# Patient Record
Sex: MALE | Race: WHITE
[De-identification: names, ages, dates, MRNs, and addresses within clinical notes are randomized per-mention and may not be internally consistent; named-entity substitution may affect disease eponyms.]

---

## 2018-08-01 ENCOUNTER — HOSPITAL ENCOUNTER (OUTPATIENT)
Dept: HOSPITAL 47 - RADCTMAIN | Age: 53
Discharge: HOME | End: 2018-08-01
Attending: SURGERY
Payer: COMMERCIAL

## 2018-08-01 DIAGNOSIS — K76.0: ICD-10-CM

## 2018-08-01 DIAGNOSIS — N32.89: Primary | ICD-10-CM

## 2018-08-01 DIAGNOSIS — R16.0: ICD-10-CM

## 2018-08-01 PROCEDURE — 74177 CT ABD & PELVIS W/CONTRAST: CPT

## 2018-08-01 NOTE — CT
EXAMINATION TYPE: CT abdomen pelvis w con

 

DATE OF EXAM: 8/1/2018

 

COMPARISON: NONE

 

HISTORY: 53-year-old male Right side mid abdominal pain x2 months.   Spigelian hernia.

 

TECHNIQUE: Contiguous axial scanning of the abdomen and pelvis following administration of 100 ml Iso
sobeida 300 IV contrast.  Delayed images through the kidneys and coronal/sagittal reconstructions perform
ed.

 

CT DLP: 665.5 mGycm

Automated exposure control for dose reduction was used.

 

 

FINDINGS:

Heart is normal size without pericardial effusion. Lung bases clear without pleural effusion.

 

Liver mildly enlarged measuring 19.0 cm. Diffuse low attenuation of the hepatic parenchyma. No focal 
lesion is seen.

 

Gallbladder, adrenal glands, kidneys, spleen, and pancreas show no gross abnormality.

 

Portal venous system is patent. No biliary ductal dilatation.

 

No dilated small bowel, free fluid, or free air. No mesenteric or retroperitoneal lymphadenopathy.

 

Normal appendix. No significant stool wording. Oral contrast has progressed to the proximal sigmoid.

 

There is moderate circumferential wall thickening of the bladder. Prostate gland measures 4.4 cm wide
. No abnormal fluid collection in the pelvis or pelvic lymphadenopathy.

 

With attention to the abdominal wall, and no discrete hernia or abdominal wall defect is identified. 
No masses seen in the subcutaneous adipose layer

 

Bones: Mild facet arthropathy lower lumbar spine. Degenerative disc disease from L2 through S1 levels
. Grade 1 retrolisthesis at L3-L4 and L4-L5.

 

 

 

 

IMPRESSION: 

 

1. NO VENTRAL ABDOMINAL WALL OR SPIGELIAN HERNIA IDENTIFIED. IF PERSISTENT CONCERN, THE STUDY CAN BE 
REVIEWED WITH DIRECTED ATTENTION.

2. 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

3. MODERATE CIRCUMFERENTIAL BLADDER WALL THICKENING COULD REPRESENT CHRONIC BLADDER WALL HYPERTROPHY.
 CORRELATE TO EXCLUDE CYSTITIS.

## 2018-12-10 ENCOUNTER — HOSPITAL ENCOUNTER (OUTPATIENT)
Dept: HOSPITAL 47 - RADNMMAIN | Age: 53
Discharge: HOME | End: 2018-12-10
Attending: SURGERY
Payer: COMMERCIAL

## 2018-12-10 DIAGNOSIS — K82.8: Primary | ICD-10-CM

## 2018-12-10 PROCEDURE — 78226 HEPATOBILIARY SYSTEM IMAGING: CPT

## 2018-12-10 NOTE — NM
Nuclear medicine hepatobiliary scan.

 

HISTORY: Pain.

 

DOSAGE: The patient received  8 ounces of ensure plus and  5.2  mCi of Technetium 99m Choletec.  

 

FINDINGS: There is normal hepatic extraction.  Now localized area of diminished uptake involving the 
left lobe of the liver likely artifactual. Correlate with ultrasound as clinically warranted. No abno
rmality noted in this region on the CT scan of 8/1/2018 The gallbladder is seen by 10 minutes.  There
 is biliary to bowel clearance by 45 minutes.  Ejection fraction is 51%.

 

IMPRESSION:

1. No evidence of cholecystitis.

2. Ejection fraction is 51%.

## 2018-12-11 ENCOUNTER — HOSPITAL ENCOUNTER (OUTPATIENT)
Dept: HOSPITAL 47 - RADCTMAIN | Age: 53
Discharge: HOME | End: 2018-12-11
Attending: SURGERY
Payer: COMMERCIAL

## 2018-12-11 DIAGNOSIS — K82.0: Primary | ICD-10-CM

## 2018-12-11 DIAGNOSIS — K44.9: ICD-10-CM

## 2018-12-11 DIAGNOSIS — K76.0: ICD-10-CM

## 2018-12-11 PROCEDURE — 74177 CT ABD & PELVIS W/CONTRAST: CPT

## 2018-12-12 NOTE — CT
EXAMINATION TYPE: CT abdomen pelvis w con

 

DATE OF EXAM: 12/11/2018

 

COMPARISON: 8/1/2018

 

HISTORY: RUQ pain

 

CT DLP: 589.4 mGycm

Automated exposure control for dose reduction was used.

 

TECHNIQUE:  Helical acquisition of images was performed from the lung bases through the pelvis.

 

CONTRAST: 

Performed with Oral Contrast and with IV Contrast, patient injected with 100 mL of Isovue 300.

 

FINDINGS: 

 

LUNG BASES: No significant abnormality is appreciated.

 

LIVER/GB: Hepatic parenchyma is diffusely hypoattenuated in comparison to that of the spleen, most co
mmonly seen in hepatic steatosis. This finding limits evaluation for hepatic masses. No gross evidenc
e of hepatic mass is seen. No intrahepatic biliary ductal dilatation. No cholelithiasis. Gallbladder 
is contracted

 

PANCREAS: No significant abnormality is seen.

 

SPLEEN: No significant abnormality is seen.

 

ADRENALS: No significant abnormality is seen.

 

KIDNEYS: Kidneys enhance and excrete symmetrically without hydronephrosis.

 

ADENOPATHY:  No greater than 1 cm short axis lymph node is seen within the abdomen or pelvis.

 

REPRODUCTIVE ORGANS: Prostate gland is heterogenous containing central zone calcifications.

 

URINARY BLADDER:  Urinary bladder is better distended on the current examination than on the prior.

 

OSSEOUS STRUCTURES:  Mild multilevel degenerative changes of the spine are present

 

BOWEL:  Small hiatal hernia is noted. No dilated large or small bowel is seen. Moderate amount retain
ed colonic stool is noted. Appendix is air-filled and within normal limits

 

 

IMPRESSION: 

1. REDEMONSTRATION OF MILD GRADE HEPATIC STEATOSIS.

2. CONTRACTED GALLBLADDER WITH NO INTRAHEPATIC BILIARY DUCTAL DILATATION OR EXTRAHEPATIC BILIARY DUCT
AL DILATATION IDENTIFIED.

3. SMALL HIATAL HERNIA.

## 2019-01-08 ENCOUNTER — HOSPITAL ENCOUNTER (OUTPATIENT)
Dept: HOSPITAL 47 - ORWHC2ENDO | Age: 54
Discharge: HOME | End: 2019-01-08
Attending: SURGERY
Payer: COMMERCIAL

## 2019-01-08 VITALS — RESPIRATION RATE: 18 BRPM | HEART RATE: 53 BPM | DIASTOLIC BLOOD PRESSURE: 68 MMHG | SYSTOLIC BLOOD PRESSURE: 109 MMHG

## 2019-01-08 VITALS — BODY MASS INDEX: 26.6 KG/M2

## 2019-01-08 VITALS — TEMPERATURE: 97.1 F

## 2019-01-08 DIAGNOSIS — K64.8: Primary | ICD-10-CM

## 2019-01-08 DIAGNOSIS — Z87.891: ICD-10-CM

## 2019-01-08 DIAGNOSIS — Z79.890: ICD-10-CM

## 2019-01-08 PROCEDURE — 45378 DIAGNOSTIC COLONOSCOPY: CPT

## 2019-01-08 NOTE — P.OP
Date of Procedure: 01/08/19


Preoperative Diagnosis: 


GI bleed


Postoperative Diagnosis: 


Mild internal hemorrhoids





Normal colonoscopy


Procedure(s) Performed: 


Colonoscopy


Anesthesia: MAC


Surgeon: Judah Oliver


Pathology: none sent


Condition: stable


Disposition: PACU


Description of Procedure: 


The patient's placed on the endoscopy table in the lateral position.  He 

received IV sedation.  Digital rectal exam was performed which revealed a few 

internal hemorrhoids.  Colonoscope was then placed patient anus and passed 

throughout the entire colon.  The ileocecal valve was visualized.  The cecum, 

ascending and transverse colon appeared normal.  The descending and sigmoid 

colon appeared normal.  The scope was then brought back the rectum and this was 

normal.  Scope was withdrawn through the anus and internal hemorrhoids are 

noted.  Scope was withdrawn for patient.





There is no evidence of any GI bleed.  It was presumed that the patient had 

bleeding from his internal hemorrhoids in the past.

## 2019-01-08 NOTE — P.GSHP
History of Present Illness


H&P Date: 01/08/19


Chief Complaint: GI bleed





This is a 53-year-old male referred from Dr. Mal Corona.  Patient presents 

today for colonoscopy.  He's had issues with rectal bleeding.





Past Medical History


Additional Past Medical History / Comment(s): Hemorrhoids


History of Any Multi-Drug Resistant Organisms: None Reported


Past Surgical History: Orthopedic Surgery, Tonsillectomy


Additional Past Surgical History / Comment(s): 2 back surgeries, colonoscopy, 

ear tubes.


Past Anesthesia/Blood Transfusion Reactions: No Reported Reaction


Smoking Status: Former smoker





- Past Family History


  ** Mother


Family Medical History: Cancer





  ** Father


Family Medical History: Cancer





Medications and Allergies


 Home Medications











 Medication  Instructions  Recorded  Confirmed  Type


 


Calcium Carbonate [Calcium] 600 mg PO DAILY 12/14/18 01/02/19 History


 


Magnesium 200 mg PO DAILY 12/14/18 01/02/19 History


 


Multivitamin [Men's Multi-Vitamin] 1 each PO DAILY 12/14/18 01/02/19 History


 


Omega-3 Fatty Acids/Fish Oil [Fish 1 each PO DAILY 12/14/18 01/02/19 History





Oil 1,000 mg Softgel]    


 


Testosterone (Unknown Dose) 1 tab PO DAILY 12/14/18 01/08/19 History











 Allergies











Allergy/AdvReac Type Severity Reaction Status Date / Time


 


No Known Allergies Allergy   Verified 12/14/18 11:37














Surgical - Exam


 Vital Signs











Temp Pulse Resp BP Pulse Ox


 


 97.1 F L  63   16   130/83   97 


 


 01/08/19 11:28  01/08/19 11:28  01/08/19 11:28  01/08/19 11:28  01/08/19 11:28














- General


well developed, no distress





- Eyes


PERRL





- ENT


normal pinna





- Neck


no masses





- Respiratory


normal expansion





- Cardiovascular


Rhythm: regular





- Abdomen


Abdomen: soft, non tender





Assessment and Plan


Assessment: 


GI bleed.  We'll perform colonoscopy.

## 2020-06-17 ENCOUNTER — HOSPITAL ENCOUNTER (OUTPATIENT)
Dept: HOSPITAL 47 - LABWHC1 | Age: 55
Discharge: HOME | End: 2020-06-17
Attending: NEUROLOGICAL SURGERY
Payer: COMMERCIAL

## 2020-06-17 DIAGNOSIS — M47.896: Primary | ICD-10-CM

## 2020-06-17 LAB
ALBUMIN SERPL-MCNC: 4.8 G/DL (ref 3.8–4.9)
ALBUMIN/GLOB SERPL: 2.09 G/DL (ref 1.6–3.17)
ALP SERPL-CCNC: 39 U/L (ref 41–126)
ALT SERPL-CCNC: 88 U/L (ref 10–49)
ANION GAP SERPL CALC-SCNC: 7.1 MMOL/L (ref 4–12)
APTT BLD: 27.1 SEC (ref 22–30)
AST SERPL-CCNC: 56 U/L (ref 14–35)
BASOPHILS # BLD AUTO: 0 K/UL (ref 0–0.2)
BASOPHILS NFR BLD AUTO: 1 %
BUN SERPL-SCNC: 19 MG/DL (ref 9–27)
BUN/CREAT SERPL: 17.27 RATIO (ref 12–20)
CALCIUM SPEC-MCNC: 9.6 MG/DL (ref 8.7–10.3)
CHLORIDE SERPL-SCNC: 99 MMOL/L (ref 96–109)
CO2 SERPL-SCNC: 27.9 MMOL/L (ref 21.6–31.8)
EOSINOPHIL # BLD AUTO: 0.2 K/UL (ref 0–0.7)
EOSINOPHIL NFR BLD AUTO: 3 %
ERYTHROCYTE [DISTWIDTH] IN BLOOD BY AUTOMATED COUNT: 5.01 M/UL (ref 4.3–5.9)
ERYTHROCYTE [DISTWIDTH] IN BLOOD: 12.7 % (ref 11.5–15.5)
GLOBULIN SER CALC-MCNC: 2.3 G/DL (ref 1.6–3.3)
GLUCOSE SERPL-MCNC: 101 MG/DL (ref 70–110)
HBA1C MFR BLD: 5.4 % (ref 4–6)
HCT VFR BLD AUTO: 47.3 % (ref 39–53)
HGB BLD-MCNC: 15.8 GM/DL (ref 13–17.5)
INR PPP: 1 (ref ?–1.2)
LYMPHOCYTES # SPEC AUTO: 2.1 K/UL (ref 1–4.8)
LYMPHOCYTES NFR SPEC AUTO: 37 %
MCH RBC QN AUTO: 31.6 PG (ref 25–35)
MCHC RBC AUTO-ENTMCNC: 33.4 G/DL (ref 31–37)
MCV RBC AUTO: 94.4 FL (ref 80–100)
MONOCYTES # BLD AUTO: 0.3 K/UL (ref 0–1)
MONOCYTES NFR BLD AUTO: 6 %
NEUTROPHILS # BLD AUTO: 3 K/UL (ref 1.3–7.7)
NEUTROPHILS NFR BLD AUTO: 51 %
PH UR: 6 [PH] (ref 5–8)
PLATELET # BLD AUTO: 248 K/UL (ref 150–450)
POTASSIUM SERPL-SCNC: 4.5 MMOL/L (ref 3.5–5.5)
PREALB SERPL-MCNC: 35 MG/DL (ref 18–42)
PROT SERPL-MCNC: 7.1 G/DL (ref 6.2–8.2)
PT BLD: 10.8 SEC (ref 9–12)
SODIUM SERPL-SCNC: 134 MMOL/L (ref 135–145)
SP GR UR: 1.01 (ref 1–1.03)
UROBILINOGEN UR QL STRIP: <2 MG/DL (ref ?–2)
WBC # BLD AUTO: 5.8 K/UL (ref 3.8–10.6)

## 2020-06-17 PROCEDURE — 36415 COLL VENOUS BLD VENIPUNCTURE: CPT

## 2020-06-17 PROCEDURE — 93005 ELECTROCARDIOGRAM TRACING: CPT

## 2020-06-17 PROCEDURE — 84134 ASSAY OF PREALBUMIN: CPT

## 2020-06-17 PROCEDURE — 81003 URINALYSIS AUTO W/O SCOPE: CPT

## 2020-06-17 PROCEDURE — 80053 COMPREHEN METABOLIC PANEL: CPT

## 2020-06-17 PROCEDURE — 85730 THROMBOPLASTIN TIME PARTIAL: CPT

## 2020-06-17 PROCEDURE — 85610 PROTHROMBIN TIME: CPT

## 2020-06-17 PROCEDURE — 85025 COMPLETE CBC W/AUTO DIFF WBC: CPT

## 2020-06-17 PROCEDURE — 83036 HEMOGLOBIN GLYCOSYLATED A1C: CPT
